# Patient Record
Sex: FEMALE | Race: WHITE | NOT HISPANIC OR LATINO | URBAN - METROPOLITAN AREA
[De-identification: names, ages, dates, MRNs, and addresses within clinical notes are randomized per-mention and may not be internally consistent; named-entity substitution may affect disease eponyms.]

---

## 2023-09-20 VITALS
RESPIRATION RATE: 16 BRPM | TEMPERATURE: 97 F | OXYGEN SATURATION: 97 % | DIASTOLIC BLOOD PRESSURE: 68 MMHG | WEIGHT: 119.71 LBS | HEART RATE: 87 BPM | SYSTOLIC BLOOD PRESSURE: 99 MMHG | HEIGHT: 66 IN

## 2023-09-20 RX ORDER — ESCITALOPRAM OXALATE 10 MG/1
2 TABLET, FILM COATED ORAL
Refills: 0 | DISCHARGE

## 2023-09-21 ENCOUNTER — INPATIENT (INPATIENT)
Facility: HOSPITAL | Age: 33
LOS: 3 days | Discharge: HOME | DRG: 138 | End: 2023-09-25
Attending: PLASTIC SURGERY | Admitting: PLASTIC SURGERY
Payer: COMMERCIAL

## 2023-09-21 DIAGNOSIS — Z98.890 OTHER SPECIFIED POSTPROCEDURAL STATES: Chronic | ICD-10-CM

## 2023-09-21 LAB
ANION GAP SERPL CALC-SCNC: 12 MMOL/L — SIGNIFICANT CHANGE UP (ref 5–17)
APTT BLD: 26.1 SEC — SIGNIFICANT CHANGE UP (ref 24.5–35.6)
BUN SERPL-MCNC: 16 MG/DL — SIGNIFICANT CHANGE UP (ref 7–23)
CALCIUM SERPL-MCNC: 8.6 MG/DL — SIGNIFICANT CHANGE UP (ref 8.4–10.5)
CHLORIDE SERPL-SCNC: 102 MMOL/L — SIGNIFICANT CHANGE UP (ref 96–108)
CO2 SERPL-SCNC: 20 MMOL/L — LOW (ref 22–31)
CREAT SERPL-MCNC: 0.73 MG/DL — SIGNIFICANT CHANGE UP (ref 0.5–1.3)
EGFR: 111 ML/MIN/1.73M2 — SIGNIFICANT CHANGE UP
GLUCOSE BLDC GLUCOMTR-MCNC: 112 MG/DL — HIGH (ref 70–99)
GLUCOSE BLDC GLUCOMTR-MCNC: 141 MG/DL — HIGH (ref 70–99)
GLUCOSE SERPL-MCNC: 124 MG/DL — HIGH (ref 70–99)
HCT VFR BLD CALC: 33.9 % — LOW (ref 34.5–45)
HGB BLD-MCNC: 12 G/DL — SIGNIFICANT CHANGE UP (ref 11.5–15.5)
INR BLD: 1.16 — SIGNIFICANT CHANGE UP (ref 0.85–1.18)
MAGNESIUM SERPL-MCNC: 1.6 MG/DL — SIGNIFICANT CHANGE UP (ref 1.6–2.6)
MCHC RBC-ENTMCNC: 31.2 PG — SIGNIFICANT CHANGE UP (ref 27–34)
MCHC RBC-ENTMCNC: 35.4 GM/DL — SIGNIFICANT CHANGE UP (ref 32–36)
MCV RBC AUTO: 88.1 FL — SIGNIFICANT CHANGE UP (ref 80–100)
NRBC # BLD: 0 /100 WBCS — SIGNIFICANT CHANGE UP (ref 0–0)
PHOSPHATE SERPL-MCNC: 1.2 MG/DL — LOW (ref 2.5–4.5)
PLATELET # BLD AUTO: 227 K/UL — SIGNIFICANT CHANGE UP (ref 150–400)
POTASSIUM SERPL-MCNC: 4.2 MMOL/L — SIGNIFICANT CHANGE UP (ref 3.5–5.3)
POTASSIUM SERPL-SCNC: 4.2 MMOL/L — SIGNIFICANT CHANGE UP (ref 3.5–5.3)
PROTHROM AB SERPL-ACNC: 13.2 SEC — HIGH (ref 9.5–13)
RBC # BLD: 3.85 M/UL — SIGNIFICANT CHANGE UP (ref 3.8–5.2)
RBC # FLD: 12.9 % — SIGNIFICANT CHANGE UP (ref 10.3–14.5)
SODIUM SERPL-SCNC: 134 MMOL/L — LOW (ref 135–145)
WBC # BLD: 9.45 K/UL — SIGNIFICANT CHANGE UP (ref 3.8–10.5)
WBC # FLD AUTO: 9.45 K/UL — SIGNIFICANT CHANGE UP (ref 3.8–10.5)

## 2023-09-21 PROCEDURE — 99221 1ST HOSP IP/OBS SF/LOW 40: CPT | Mod: GC

## 2023-09-21 PROCEDURE — 88307 TISSUE EXAM BY PATHOLOGIST: CPT | Mod: 26

## 2023-09-21 RX ORDER — SODIUM CHLORIDE 9 MG/ML
1000 INJECTION, SOLUTION INTRAVENOUS
Refills: 0 | Status: DISCONTINUED | OUTPATIENT
Start: 2023-09-21 | End: 2023-09-22

## 2023-09-21 RX ORDER — HYDROMORPHONE HYDROCHLORIDE 2 MG/ML
0.2 INJECTION INTRAMUSCULAR; INTRAVENOUS; SUBCUTANEOUS EVERY 4 HOURS
Refills: 0 | Status: DISCONTINUED | OUTPATIENT
Start: 2023-09-21 | End: 2023-09-25

## 2023-09-21 RX ORDER — SODIUM CHLORIDE 9 MG/ML
1000 INJECTION, SOLUTION INTRAVENOUS
Refills: 0 | Status: DISCONTINUED | OUTPATIENT
Start: 2023-09-21 | End: 2023-09-25

## 2023-09-21 RX ORDER — GLUCAGON INJECTION, SOLUTION 0.5 MG/.1ML
1 INJECTION, SOLUTION SUBCUTANEOUS ONCE
Refills: 0 | Status: DISCONTINUED | OUTPATIENT
Start: 2023-09-21 | End: 2023-09-25

## 2023-09-21 RX ORDER — DEXTROSE 50 % IN WATER 50 %
25 SYRINGE (ML) INTRAVENOUS ONCE
Refills: 0 | Status: DISCONTINUED | OUTPATIENT
Start: 2023-09-21 | End: 2023-09-25

## 2023-09-21 RX ORDER — DEXAMETHASONE 0.5 MG/5ML
10 ELIXIR ORAL EVERY 6 HOURS
Refills: 0 | Status: DISCONTINUED | OUTPATIENT
Start: 2023-09-21 | End: 2023-09-24

## 2023-09-21 RX ORDER — SPIRONOLACTONE 25 MG/1
1 TABLET, FILM COATED ORAL
Refills: 0 | DISCHARGE

## 2023-09-21 RX ORDER — EMTRICITABINE AND TENOFOVIR DISOPROXIL FUMARATE 200; 300 MG/1; MG/1
1 TABLET, FILM COATED ORAL
Refills: 0 | DISCHARGE

## 2023-09-21 RX ORDER — INFLUENZA VIRUS VACCINE 15; 15; 15; 15 UG/.5ML; UG/.5ML; UG/.5ML; UG/.5ML
0.5 SUSPENSION INTRAMUSCULAR ONCE
Refills: 0 | Status: DISCONTINUED | OUTPATIENT
Start: 2023-09-21 | End: 2023-09-22

## 2023-09-21 RX ORDER — MAGNESIUM SULFATE 500 MG/ML
2 VIAL (ML) INJECTION ONCE
Refills: 0 | Status: DISCONTINUED | OUTPATIENT
Start: 2023-09-21 | End: 2023-09-21

## 2023-09-21 RX ORDER — NORETHINDRONE 0.35 MG/1
1 TABLET ORAL
Refills: 0 | DISCHARGE

## 2023-09-21 RX ORDER — DEXTROSE 50 % IN WATER 50 %
15 SYRINGE (ML) INTRAVENOUS ONCE
Refills: 0 | Status: DISCONTINUED | OUTPATIENT
Start: 2023-09-21 | End: 2023-09-25

## 2023-09-21 RX ORDER — MAGNESIUM SULFATE 500 MG/ML
2 VIAL (ML) INJECTION ONCE
Refills: 0 | Status: COMPLETED | OUTPATIENT
Start: 2023-09-21 | End: 2023-09-21

## 2023-09-21 RX ORDER — DEXTROSE 50 % IN WATER 50 %
12.5 SYRINGE (ML) INTRAVENOUS ONCE
Refills: 0 | Status: DISCONTINUED | OUTPATIENT
Start: 2023-09-21 | End: 2023-09-25

## 2023-09-21 RX ORDER — DEXAMETHASONE 0.5 MG/5ML
10 ELIXIR ORAL EVERY 6 HOURS
Refills: 0 | Status: DISCONTINUED | OUTPATIENT
Start: 2023-09-21 | End: 2023-09-21

## 2023-09-21 RX ORDER — ACETAMINOPHEN 500 MG
1000 TABLET ORAL ONCE
Refills: 0 | Status: COMPLETED | OUTPATIENT
Start: 2023-09-21 | End: 2023-09-21

## 2023-09-21 RX ORDER — CHLORHEXIDINE GLUCONATE 213 G/1000ML
15 SOLUTION TOPICAL
Refills: 0 | Status: DISCONTINUED | OUTPATIENT
Start: 2023-09-21 | End: 2023-09-24

## 2023-09-21 RX ORDER — HYDROMORPHONE HYDROCHLORIDE 2 MG/ML
0.5 INJECTION INTRAMUSCULAR; INTRAVENOUS; SUBCUTANEOUS EVERY 4 HOURS
Refills: 0 | Status: DISCONTINUED | OUTPATIENT
Start: 2023-09-21 | End: 2023-09-25

## 2023-09-21 RX ORDER — ESCITALOPRAM OXALATE 10 MG/1
1 TABLET, FILM COATED ORAL
Refills: 0 | DISCHARGE

## 2023-09-21 RX ORDER — INSULIN LISPRO 100/ML
VIAL (ML) SUBCUTANEOUS EVERY 6 HOURS
Refills: 0 | Status: DISCONTINUED | OUTPATIENT
Start: 2023-09-21 | End: 2023-09-25

## 2023-09-21 RX ORDER — LIDOCAINE 4 G/100G
1 CREAM TOPICAL ONCE
Refills: 0 | Status: DISCONTINUED | OUTPATIENT
Start: 2023-09-21 | End: 2023-09-25

## 2023-09-21 RX ORDER — CHLORHEXIDINE GLUCONATE 213 G/1000ML
1 SOLUTION TOPICAL
Refills: 0 | Status: DISCONTINUED | OUTPATIENT
Start: 2023-09-21 | End: 2023-09-22

## 2023-09-21 RX ADMIN — HYDROMORPHONE HYDROCHLORIDE 0.5 MILLIGRAM(S): 2 INJECTION INTRAMUSCULAR; INTRAVENOUS; SUBCUTANEOUS at 22:47

## 2023-09-21 RX ADMIN — HYDROMORPHONE HYDROCHLORIDE 0.2 MILLIGRAM(S): 2 INJECTION INTRAMUSCULAR; INTRAVENOUS; SUBCUTANEOUS at 14:23

## 2023-09-21 RX ADMIN — SODIUM CHLORIDE 75 MILLILITER(S): 9 INJECTION, SOLUTION INTRAVENOUS at 18:53

## 2023-09-21 RX ADMIN — SODIUM CHLORIDE 75 MILLILITER(S): 9 INJECTION, SOLUTION INTRAVENOUS at 11:48

## 2023-09-21 RX ADMIN — Medication 100 MILLIGRAM(S): at 23:18

## 2023-09-21 RX ADMIN — HYDROMORPHONE HYDROCHLORIDE 0.5 MILLIGRAM(S): 2 INJECTION INTRAMUSCULAR; INTRAVENOUS; SUBCUTANEOUS at 12:03

## 2023-09-21 RX ADMIN — Medication 100 MILLIGRAM(S): at 15:35

## 2023-09-21 RX ADMIN — HYDROMORPHONE HYDROCHLORIDE 0.2 MILLIGRAM(S): 2 INJECTION INTRAMUSCULAR; INTRAVENOUS; SUBCUTANEOUS at 14:48

## 2023-09-21 RX ADMIN — HYDROMORPHONE HYDROCHLORIDE 0.5 MILLIGRAM(S): 2 INJECTION INTRAMUSCULAR; INTRAVENOUS; SUBCUTANEOUS at 23:02

## 2023-09-21 RX ADMIN — HYDROMORPHONE HYDROCHLORIDE 0.5 MILLIGRAM(S): 2 INJECTION INTRAMUSCULAR; INTRAVENOUS; SUBCUTANEOUS at 19:08

## 2023-09-21 RX ADMIN — Medication 1000 MILLIGRAM(S): at 22:47

## 2023-09-21 RX ADMIN — Medication 85 MILLIMOLE(S): at 15:35

## 2023-09-21 RX ADMIN — Medication 25 GRAM(S): at 13:27

## 2023-09-21 RX ADMIN — HYDROMORPHONE HYDROCHLORIDE 0.5 MILLIGRAM(S): 2 INJECTION INTRAMUSCULAR; INTRAVENOUS; SUBCUTANEOUS at 18:44

## 2023-09-21 RX ADMIN — HYDROMORPHONE HYDROCHLORIDE 0.2 MILLIGRAM(S): 2 INJECTION INTRAMUSCULAR; INTRAVENOUS; SUBCUTANEOUS at 21:13

## 2023-09-21 RX ADMIN — Medication 400 MILLIGRAM(S): at 22:32

## 2023-09-21 RX ADMIN — HYDROMORPHONE HYDROCHLORIDE 0.2 MILLIGRAM(S): 2 INJECTION INTRAMUSCULAR; INTRAVENOUS; SUBCUTANEOUS at 21:28

## 2023-09-21 RX ADMIN — Medication 102 MILLIGRAM(S): at 15:34

## 2023-09-21 RX ADMIN — HYDROMORPHONE HYDROCHLORIDE 0.5 MILLIGRAM(S): 2 INJECTION INTRAMUSCULAR; INTRAVENOUS; SUBCUTANEOUS at 11:48

## 2023-09-21 RX ADMIN — CHLORHEXIDINE GLUCONATE 15 MILLILITER(S): 213 SOLUTION TOPICAL at 18:44

## 2023-09-21 RX ADMIN — Medication 102 MILLIGRAM(S): at 21:13

## 2023-09-21 NOTE — PRE-ANESTHESIA EVALUATION ADULT - NSANTHOSAYNRD_GEN_A_CORE
No. ROSE MARIE screening performed.  STOP BANG Legend: 0-2 = LOW Risk; 3-4 = INTERMEDIATE Risk; 5-8 = HIGH Risk

## 2023-09-21 NOTE — H&P ADULT - NSHPPHYSICALEXAM_GEN_ALL_CORE
PHYSICAL EXAM:  GENERAL: NAD, well-groomed, well-developed  HEAD:  Atraumatic, Normocephalic  HEART: Regular rate and rhythm; No murmurs, rubs, or gallops  RESPIRATORY: CTA B/L, No W/R/R  ABDOMEN: Soft, Nontender, Nondistended  SKIN: warm, dry, normal color, no rash or abnormal lesions

## 2023-09-21 NOTE — H&P ADULT - NSHPLABSRESULTS_GEN_ALL_CORE
Complete Blood Count STAT (09.21.23 @ 11:26)    Nucleated RBC: 0 /100 WBCs    WBC Count: 9.45 K/uL    RBC Count: 3.85 M/uL    Hemoglobin: 12.0 g/dL    Hematocrit: 33.9 %    Mean Cell Volume: 88.1 fl    Mean Cell Hemoglobin: 31.2 pg    Mean Cell Hemoglobin Conc: 35.4 gm/dL    Red Cell Distrib Width: 12.9 %    Platelet Count - Automated: 227 K/uL

## 2023-09-21 NOTE — H&P ADULT - ASSESSMENT
34 YO F hx of depression anxiety, presenting for tongue reduction.    - plan for OR for tongue reduction on 9/21  - pain control  - plan for ICU admission post operatively for airway monitoring    Plastic Surgery

## 2023-09-21 NOTE — CONSULT NOTE ADULT - ATTENDING COMMENTS
Macroplatelet disorder s/p glossectomy  physical as above  continue humidified oxygen  pain control with dilaudid  Clindamycin

## 2023-09-21 NOTE — H&P ADULT - NSICDXPASTMEDICALHX_GEN_ALL_CORE_FT
PAST MEDICAL HISTORY:  Anxiety     H/O hypoglycemia     History of chronic fatigue syndrome     History of surgery     History of temporomandibular joint disorder     PCOS (polycystic ovarian syndrome)

## 2023-09-21 NOTE — CONSULT NOTE ADULT - SUBJECTIVE AND OBJECTIVE BOX
33yF PMHx depression, anxiety, macroplatelets (bleeds easily?), who presents w/ enlarged tongue, now s/p tongue reduction (U-shaped glossectomy, closed primarily) (9/21). Admitted to SICU postoperatively for hemodynamic and airway monitoring.    Allergies    adhesives (Rash)  fluconazole (Unknown)  sulfa drugs (Unknown)  Gluten (Other)        ICU Vital Signs Last 24 Hrs  T(F): 97.2 (09-20-23 @ 15:44), Max: 97.2 (09-20-23 @ 15:44)  HR: 87 (09-21-23 @ 07:30) (87 - 87)  BP: 99/68 (09-21-23 @ 07:30) (99/68 - 99/68)  BP(mean): --  ABP: --  RR: 16 (09-21-23 @ 07:30) (16 - 16)  SpO2: 97% (09-21-23 @ 07:30) (97% - 97%)    PHYSICAL EXAM:   Neurological: AAOx3, CNII-XII intact,  strength 5/5 b/l  ENT: mucus membrane moist, incisions on tongue clean, dry and intact, minimal swelling noted  Cardiovascular: RRR  Respiratory: CTA, on face tent, no increased respiratory effort  Gastrointestinal: soft, NT, ND  Extremities: warm, no dependent edema  Vascular: no cyanosis/erythema  Skin: no rashes  MSK: no joint swelling.     CAPILLARY BLOOD GLUCOSE      POCT Blood Glucose.: 112 mg/dL (21 Sep 2023 11:00)         33yF PMHx depression, anxiety, macroplatelets (bleeds easily?), who presents w/ enlarged tongue, now s/p tongue reduction (U-shaped glossectomy, closed primarily) (9/21). Admitted to SICU postoperatively for hemodynamic and airway monitoring.    Meds:  4.5mg Naltrexone  0.35mg Norethindrone  Truvada  5mg Lexapro    Allergies    adhesives (Rash)  fluconazole (Unknown)  sulfa drugs (Unknown)  Gluten (Other)        ICU Vital Signs Last 24 Hrs  T(F): 97.2 (09-20-23 @ 15:44), Max: 97.2 (09-20-23 @ 15:44)  HR: 87 (09-21-23 @ 07:30) (87 - 87)  BP: 99/68 (09-21-23 @ 07:30) (99/68 - 99/68)  BP(mean): --  ABP: --  RR: 16 (09-21-23 @ 07:30) (16 - 16)  SpO2: 97% (09-21-23 @ 07:30) (97% - 97%)    PHYSICAL EXAM:   Neurological: AAOx3, CNII-XII intact,  strength 5/5 b/l  ENT: mucus membrane moist, incisions on tongue intact, some mild oozing present, no active bleeding present  Cardiovascular: RRR  Respiratory: CTA, on face tent, no increased respiratory effort  Gastrointestinal: soft, NT, ND  Extremities: warm, no dependent edema  Vascular: no cyanosis/erythema  Skin: no rashes  MSK: no joint swelling.     CAPILLARY BLOOD GLUCOSE      POCT Blood Glucose.: 112 mg/dL (21 Sep 2023 11:00)

## 2023-09-21 NOTE — CONSULT NOTE ADULT - ASSESSMENT
33yF PMHx depression, anxiety, macroplatelets (bleeds easily?), who presents w/ enlarged tongue, now s/p tongue reduction (U-shaped glossectomy, closed primarily) (9/21). Admitted to SICU postoperatively for hemodynamic and airway monitoring.     NEURO: Aox3. Anxiety, depression on ____Pain control with dilaudid prn.   HEENT: S/p partial glossectomy. Decadron 10 q6. Peridex 15mg  CV: HD stable MAPs >65.   PULM: Breathing on RA.   GI/FEN: NPO except meds/IVF  : Primafit TOV (P)   ENDO: mISS  ID: Clindamycin 900mg qd x5 days  PPX: SCDs, hold SQH  LINES: PIVs  DRAINS: None  DISPO: SICU for 24 hrs 33yF PMHx depression, anxiety, macroplatelets (bleeds easily?), who presents w/ enlarged tongue, now s/p tongue reduction (U-shaped glossectomy, closed primarily) (9/21). Admitted to SICU postoperatively for hemodynamic and airway monitoring.     NEURO: Aox3. Anxiety, depression on lexapro 5mg, naltrexone 4.5mg. Pain control with dilaudid prn.   HEENT: S/p partial glossectomy. Decadron 10 q6. Peridex 15mg. Pt can suction top of tongue, avoiding incisions.  CV: HD stable MAPs >65.   PULM: Breathing on RA.   GI/FEN: NPO except meds/IVF  : Primafit TOV (P)   ENDO: mISS  ID: Clindamycin 900mg qd x5 days. Pt on Truvada at home.  PPX: SCDs, hold SQH  LINES: PIVs  DRAINS: None  DISPO: SICU for 24 hrs

## 2023-09-21 NOTE — H&P ADULT - NSICDXPASTSURGICALHX_GEN_ALL_CORE_FT
PAST SURGICAL HISTORY:  H/O rhinoplasty     History of nasal septoplasty     History of surgery turbenectomy    History of surgery labialplasty    History of surgery vestebulectomy

## 2023-09-21 NOTE — BRIEF OPERATIVE NOTE - OPERATION/FINDINGS
Pt w/ macroglossia.  Partial glossectomy by excision of U shaped wedge of lateral and anterior aspects of tongue.  Closed with 2-0, 3-0 vicryls.

## 2023-09-22 LAB
A1C WITH ESTIMATED AVERAGE GLUCOSE RESULT: 5.4 % — SIGNIFICANT CHANGE UP (ref 4–5.6)
ANION GAP SERPL CALC-SCNC: 10 MMOL/L — SIGNIFICANT CHANGE UP (ref 5–17)
BUN SERPL-MCNC: 11 MG/DL — SIGNIFICANT CHANGE UP (ref 7–23)
CALCIUM SERPL-MCNC: 9.1 MG/DL — SIGNIFICANT CHANGE UP (ref 8.4–10.5)
CHLORIDE SERPL-SCNC: 102 MMOL/L — SIGNIFICANT CHANGE UP (ref 96–108)
CO2 SERPL-SCNC: 23 MMOL/L — SIGNIFICANT CHANGE UP (ref 22–31)
CREAT SERPL-MCNC: 0.6 MG/DL — SIGNIFICANT CHANGE UP (ref 0.5–1.3)
EGFR: 121 ML/MIN/1.73M2 — SIGNIFICANT CHANGE UP
ESTIMATED AVERAGE GLUCOSE: 108 MG/DL — SIGNIFICANT CHANGE UP (ref 68–114)
GLUCOSE BLDC GLUCOMTR-MCNC: 110 MG/DL — HIGH (ref 70–99)
GLUCOSE BLDC GLUCOMTR-MCNC: 123 MG/DL — HIGH (ref 70–99)
GLUCOSE BLDC GLUCOMTR-MCNC: 131 MG/DL — HIGH (ref 70–99)
GLUCOSE BLDC GLUCOMTR-MCNC: 139 MG/DL — HIGH (ref 70–99)
GLUCOSE BLDC GLUCOMTR-MCNC: 144 MG/DL — HIGH (ref 70–99)
GLUCOSE SERPL-MCNC: 146 MG/DL — HIGH (ref 70–99)
HCT VFR BLD CALC: 37.3 % — SIGNIFICANT CHANGE UP (ref 34.5–45)
HGB BLD-MCNC: 12.5 G/DL — SIGNIFICANT CHANGE UP (ref 11.5–15.5)
MAGNESIUM SERPL-MCNC: 2.1 MG/DL — SIGNIFICANT CHANGE UP (ref 1.6–2.6)
MCHC RBC-ENTMCNC: 30.7 PG — SIGNIFICANT CHANGE UP (ref 27–34)
MCHC RBC-ENTMCNC: 33.5 GM/DL — SIGNIFICANT CHANGE UP (ref 32–36)
MCV RBC AUTO: 91.6 FL — SIGNIFICANT CHANGE UP (ref 80–100)
NRBC # BLD: 0 /100 WBCS — SIGNIFICANT CHANGE UP (ref 0–0)
PHOSPHATE SERPL-MCNC: 3.7 MG/DL — SIGNIFICANT CHANGE UP (ref 2.5–4.5)
PLATELET # BLD AUTO: 223 K/UL — SIGNIFICANT CHANGE UP (ref 150–400)
POTASSIUM SERPL-MCNC: 4.6 MMOL/L — SIGNIFICANT CHANGE UP (ref 3.5–5.3)
POTASSIUM SERPL-SCNC: 4.6 MMOL/L — SIGNIFICANT CHANGE UP (ref 3.5–5.3)
RBC # BLD: 4.07 M/UL — SIGNIFICANT CHANGE UP (ref 3.8–5.2)
RBC # FLD: 13.2 % — SIGNIFICANT CHANGE UP (ref 10.3–14.5)
SODIUM SERPL-SCNC: 135 MMOL/L — SIGNIFICANT CHANGE UP (ref 135–145)
WBC # BLD: 11.97 K/UL — HIGH (ref 3.8–10.5)
WBC # FLD AUTO: 11.97 K/UL — HIGH (ref 3.8–10.5)

## 2023-09-22 PROCEDURE — 99231 SBSQ HOSP IP/OBS SF/LOW 25: CPT | Mod: GC

## 2023-09-22 RX ORDER — ACETAMINOPHEN 500 MG
1000 TABLET ORAL ONCE
Refills: 0 | Status: COMPLETED | OUTPATIENT
Start: 2023-09-22 | End: 2023-09-22

## 2023-09-22 RX ORDER — ACETAMINOPHEN 500 MG
650 TABLET ORAL EVERY 4 HOURS
Refills: 0 | Status: DISCONTINUED | OUTPATIENT
Start: 2023-09-22 | End: 2023-09-22

## 2023-09-22 RX ORDER — SODIUM CHLORIDE 9 MG/ML
1000 INJECTION, SOLUTION INTRAVENOUS
Refills: 0 | Status: DISCONTINUED | OUTPATIENT
Start: 2023-09-22 | End: 2023-09-25

## 2023-09-22 RX ADMIN — Medication 102 MILLIGRAM(S): at 03:47

## 2023-09-22 RX ADMIN — Medication 100 MILLIGRAM(S): at 23:04

## 2023-09-22 RX ADMIN — HYDROMORPHONE HYDROCHLORIDE 0.5 MILLIGRAM(S): 2 INJECTION INTRAMUSCULAR; INTRAVENOUS; SUBCUTANEOUS at 15:33

## 2023-09-22 RX ADMIN — CHLORHEXIDINE GLUCONATE 15 MILLILITER(S): 213 SOLUTION TOPICAL at 17:33

## 2023-09-22 RX ADMIN — Medication 400 MILLIGRAM(S): at 21:58

## 2023-09-22 RX ADMIN — Medication 100 MILLIGRAM(S): at 07:01

## 2023-09-22 RX ADMIN — HYDROMORPHONE HYDROCHLORIDE 0.5 MILLIGRAM(S): 2 INJECTION INTRAMUSCULAR; INTRAVENOUS; SUBCUTANEOUS at 09:04

## 2023-09-22 RX ADMIN — CHLORHEXIDINE GLUCONATE 1 APPLICATION(S): 213 SOLUTION TOPICAL at 06:28

## 2023-09-22 RX ADMIN — Medication 102 MILLIGRAM(S): at 15:07

## 2023-09-22 RX ADMIN — Medication 102 MILLIGRAM(S): at 09:04

## 2023-09-22 RX ADMIN — HYDROMORPHONE HYDROCHLORIDE 0.5 MILLIGRAM(S): 2 INJECTION INTRAMUSCULAR; INTRAVENOUS; SUBCUTANEOUS at 19:45

## 2023-09-22 RX ADMIN — Medication 1000 MILLIGRAM(S): at 22:10

## 2023-09-22 RX ADMIN — HYDROMORPHONE HYDROCHLORIDE 0.5 MILLIGRAM(S): 2 INJECTION INTRAMUSCULAR; INTRAVENOUS; SUBCUTANEOUS at 19:27

## 2023-09-22 RX ADMIN — HYDROMORPHONE HYDROCHLORIDE 0.5 MILLIGRAM(S): 2 INJECTION INTRAMUSCULAR; INTRAVENOUS; SUBCUTANEOUS at 00:20

## 2023-09-22 RX ADMIN — Medication 400 MILLIGRAM(S): at 11:29

## 2023-09-22 RX ADMIN — Medication 1000 MILLIGRAM(S): at 12:59

## 2023-09-22 RX ADMIN — SODIUM CHLORIDE 75 MILLILITER(S): 9 INJECTION, SOLUTION INTRAVENOUS at 09:53

## 2023-09-22 RX ADMIN — HYDROMORPHONE HYDROCHLORIDE 0.5 MILLIGRAM(S): 2 INJECTION INTRAMUSCULAR; INTRAVENOUS; SUBCUTANEOUS at 09:39

## 2023-09-22 RX ADMIN — Medication 1000 MILLIGRAM(S): at 05:30

## 2023-09-22 RX ADMIN — CHLORHEXIDINE GLUCONATE 15 MILLILITER(S): 213 SOLUTION TOPICAL at 06:20

## 2023-09-22 RX ADMIN — HYDROMORPHONE HYDROCHLORIDE 0.5 MILLIGRAM(S): 2 INJECTION INTRAMUSCULAR; INTRAVENOUS; SUBCUTANEOUS at 03:45

## 2023-09-22 RX ADMIN — HYDROMORPHONE HYDROCHLORIDE 0.5 MILLIGRAM(S): 2 INJECTION INTRAMUSCULAR; INTRAVENOUS; SUBCUTANEOUS at 04:00

## 2023-09-22 RX ADMIN — SODIUM CHLORIDE 100 MILLILITER(S): 9 INJECTION, SOLUTION INTRAVENOUS at 17:54

## 2023-09-22 RX ADMIN — Medication 102 MILLIGRAM(S): at 21:57

## 2023-09-22 RX ADMIN — Medication 100 MILLIGRAM(S): at 15:07

## 2023-09-22 RX ADMIN — HYDROMORPHONE HYDROCHLORIDE 0.5 MILLIGRAM(S): 2 INJECTION INTRAMUSCULAR; INTRAVENOUS; SUBCUTANEOUS at 15:53

## 2023-09-22 RX ADMIN — Medication 400 MILLIGRAM(S): at 05:15

## 2023-09-22 NOTE — SWALLOW BEDSIDE ASSESSMENT ADULT - SLP PERTINENT HISTORY OF CURRENT PROBLEM
PMHx depression, anxiety, macroplatelet disorder (bleeds easily?), who presented on 9/21/23 with enlarged tongue, now s/p tongue reduction (U-shaped glossectomy, closed primarily) on 9/21/23. Pt admitted to SICU post-operatively for hemodynamic and airway monitoring, now in step down.

## 2023-09-22 NOTE — SWALLOW BEDSIDE ASSESSMENT ADULT - ADDITIONAL RECOMMENDATIONS
LT: Patient will safely tolerate the least restrictive diet without overt s/s of penetration/aspiration or changes to pulmonary status.   ST: Patient will safely tolerate full liquids without overt s/s of penetration/aspiration or changes to pulmonary status.

## 2023-09-22 NOTE — PROGRESS NOTE ADULT - SUBJECTIVE AND OBJECTIVE BOX
Doing well. In good spirits  Wasn't able to drink water  Afebrile, VSS  No active bleeding  Intra-oral - Tongue with significant edema.   Descent oral hygiene    P/ Advised that she hsould proceed very slowly with diet  Also, reinforced that semi-solid is easier to swallow than water with so much edema and relative immobility of the tongue     - she understands  Cont IV hydration  Ambulate

## 2023-09-22 NOTE — SWALLOW BEDSIDE ASSESSMENT ADULT - PHARYNGEAL PHASE
Seemingly timely swallow, hyolaryngeal movement, with no clinical indicators of penetration/aspiration and pharyngeal inefficiency.

## 2023-09-22 NOTE — PROGRESS NOTE ADULT - ASSESSMENT
33yF PMHx depression, anxiety, macroplatelet disorder (bleeds easily?), who presents w/ enlarged tongue, now s/p tongue reduction (U-shaped glossectomy, closed primarily) (9/21). Admitted to SICU postoperatively for hemodynamic and airway monitoring.     NEURO: Aox3. Anxiety, depression on Lexapro 5mg, naltrexone 5mg. Pain control with dilaudid prn.   HEENT: S/p partial glossectomy. Decadron 10 q6. Peridex 15mg. Pt can suction on top of tongue, avoiding sutures.  CV: HD stable MAPs >65.   PULM: Breathing on RA.   GI/FEN: NPO except meds/IVF  : Voids, primafit  ENDO: mISS  ID: Clindamycin 900mg qd x5 days. On Truvada at home?  PPX: SCDs, hold SQH  LINES: PIVs  DRAINS: None  DISPO: SDU today 33yF PMHx depression, anxiety, macroplatelet disorder (bleeds easily?), who presents w/ enlarged tongue, now s/p tongue reduction (U-shaped glossectomy, closed primarily) (9/21). Admitted to SICU postoperatively for hemodynamic and airway monitoring.     NEURO: Aox3. Anxiety, depression on Lexapro 5mg, naltrexone 5mg. Pain control with tylenol, dilaudid prn.   HEENT: S/p partial glossectomy. Decadron 10 q6. Peridex 15mg. Pt can suction on top of tongue, avoiding sutures.  CV: HD stable MAPs >65.   PULM: Breathing on RA.   GI/FEN: NPO except meds/IVF. Speech and Swallow eval today.  : Voids, primafit  ENDO: mISS  ID: Clindamycin 900mg qd x5 days. On Truvada at home?  PPX: SCDs, hold SQH  LINES: PIVs  DRAINS: None  DISPO: SDU today

## 2023-09-22 NOTE — PROGRESS NOTE ADULT - ATTENDING COMMENTS
S/P glossectomy  physical as above  tylenol and oxycodone with breakthrough dilaudid  swallowing evaluation  continue clindamycin per surgery

## 2023-09-22 NOTE — SWALLOW BEDSIDE ASSESSMENT ADULT - ASR SWALLOW LINGUAL MOBILITY
Limited mobility of tongue at this time/impaired protrusion/impaired anterior elevation/impaired left lateral movement/impaired right lateral movement

## 2023-09-22 NOTE — PROGRESS NOTE ADULT - SUBJECTIVE AND OBJECTIVE BOX
S: Pt seen and evaluated on am rounds. Pt doing well with no acute events overnight.     O: ICU Vital Signs Last 24 Hrs  T(F): 98.1 (09-22-23 @ 05:11), Max: 98.6 (09-21-23 @ 10:30)  HR: 89 (09-22-23 @ 07:05) (58 - 101)  BP: 112/81 (09-22-23 @ 07:05) (99/65 - 135/89)  BP(mean): 92 (09-22-23 @ 07:05) (78 - 107)  ABP: --  RR: 18 (09-22-23 @ 07:05) (11 - 22)  SpO2: 98% (09-22-23 @ 07:05) (95% - 100%)    PHYSICAL EXAM:   Neurological: AAOx3, CNII-XII intact,  strength 5/5 b/l  ENT: mucus membrane moist  Cardiovascular: RRR  Respiratory: CTA  Gastrointestinal: soft, NT, ND, BS+  Extremities: warm, no dependent edema  Vascular: no cyanosis/erythema  Skin: no rashes  MSK: no joint swelling.     LABS:    09-22    135  |  102  |  11  ----------------------------<  146<H>  4.6   |  23  |  0.60    Ca    9.1      22 Sep 2023 05:28  Phos  3.7     09-22  Mg     2.1     09-22                          12.5   11.97 )-----------( 223      ( 22 Sep 2023 05:28 )             37.3   PT/INR - ( 21 Sep 2023 11:26 )   PT: 13.2 sec;   INR: 1.16          PTT - ( 21 Sep 2023 11:26 )  PTT:26.1 sec  Urinalysis Basic - ( 22 Sep 2023 05:28 )    Color: x / Appearance: x / SG: x / pH: x  Gluc: 146 mg/dL / Ketone: x  / Bili: x / Urobili: x   Blood: x / Protein: x / Nitrite: x   Leuk Esterase: x / RBC: x / WBC x   Sq Epi: x / Non Sq Epi: x / Bacteria: x    CAPILLARY BLOOD GLUCOSE      POCT Blood Glucose.: 110 mg/dL (22 Sep 2023 06:32)  POCT Blood Glucose.: 131 mg/dL (22 Sep 2023 00:11)  POCT Blood Glucose.: 141 mg/dL (21 Sep 2023 18:49)  POCT Blood Glucose.: 112 mg/dL (21 Sep 2023 11:00)    MEDICATIONS  (STANDING):  chlorhexidine 0.12% Liquid 15 milliLiter(s) Oral Mucosa two times a day  chlorhexidine 2% Cloths 1 Application(s) Topical <User Schedule>  clindamycin IVPB 900 milliGRAM(s) IV Intermittent every 8 hours  dexAMETHasone  IVPB 10 milliGRAM(s) IV Intermittent every 6 hours  dextrose 5%. 1000 milliLiter(s) (50 mL/Hr) IV Continuous <Continuous>  dextrose 5%. 1000 milliLiter(s) (100 mL/Hr) IV Continuous <Continuous>  dextrose 50% Injectable 25 Gram(s) IV Push once  dextrose 50% Injectable 25 Gram(s) IV Push once  dextrose 50% Injectable 12.5 Gram(s) IV Push once  glucagon  Injectable 1 milliGRAM(s) IntraMuscular once  influenza   Vaccine 0.5 milliLiter(s) IntraMuscular once  insulin lispro (ADMELOG) corrective regimen sliding scale   SubCutaneous every 6 hours  lactated ringers. 1000 milliLiter(s) (75 mL/Hr) IV Continuous <Continuous>  lidocaine 5% Ointment 1 Application(s) Topical once    MEDICATIONS  (PRN):  dextrose Oral Gel 15 Gram(s) Oral once PRN Blood Glucose LESS THAN 70 milliGRAM(s)/deciliter  HYDROmorphone  Injectable 0.5 milliGRAM(s) IV Push every 4 hours PRN Severe Pain (7 - 10)  HYDROmorphone  Injectable 0.2 milliGRAM(s) IV Push every 4 hours PRN Moderate Pain (4 - 6)      Chase:	  [ ] None	[ ] Daily Chase Order Placed	   Indication:	  [ ] Strict I and O's    [ ] Obstruction     [ ] Incontinence + Stage 3 or 4 Decubitus  Central Line:  [ ] None	   [ ]  Medication / TPN Administration     [ ] No Peripheral IV        S: Pt seen and evaluated on am rounds. Pt doing well with no acute events overnight. Still some pain but no SOB    O: ICU Vital Signs Last 24 Hrs  T(F): 98.1 (09-22-23 @ 05:11), Max: 98.6 (09-21-23 @ 10:30)  HR: 89 (09-22-23 @ 07:05) (58 - 101)  BP: 112/81 (09-22-23 @ 07:05) (99/65 - 135/89)  BP(mean): 92 (09-22-23 @ 07:05) (78 - 107)  ABP: --  RR: 18 (09-22-23 @ 07:05) (11 - 22)  SpO2: 98% (09-22-23 @ 07:05) (95% - 100%)    PHYSICAL EXAM:   Neurological: AAOx3, CNII-XII intact,  strength 5/5 b/l  ENT: mucus membrane moist  Cardiovascular: RRR  Respiratory: CTA  Gastrointestinal: soft, NT, ND, BS+  Extremities: warm, no dependent edema  Vascular: no cyanosis/erythema  Skin: no rashes  MSK: no joint swelling.     LABS:    09-22    135  |  102  |  11  ----------------------------<  146<H>  4.6   |  23  |  0.60    Ca    9.1      22 Sep 2023 05:28  Phos  3.7     09-22  Mg     2.1     09-22                          12.5   11.97 )-----------( 223      ( 22 Sep 2023 05:28 )             37.3   PT/INR - ( 21 Sep 2023 11:26 )   PT: 13.2 sec;   INR: 1.16          PTT - ( 21 Sep 2023 11:26 )  PTT:26.1 sec  Urinalysis Basic - ( 22 Sep 2023 05:28 )    Color: x / Appearance: x / SG: x / pH: x  Gluc: 146 mg/dL / Ketone: x  / Bili: x / Urobili: x   Blood: x / Protein: x / Nitrite: x   Leuk Esterase: x / RBC: x / WBC x   Sq Epi: x / Non Sq Epi: x / Bacteria: x    CAPILLARY BLOOD GLUCOSE      POCT Blood Glucose.: 110 mg/dL (22 Sep 2023 06:32)  POCT Blood Glucose.: 131 mg/dL (22 Sep 2023 00:11)  POCT Blood Glucose.: 141 mg/dL (21 Sep 2023 18:49)  POCT Blood Glucose.: 112 mg/dL (21 Sep 2023 11:00)    MEDICATIONS  (STANDING):  chlorhexidine 0.12% Liquid 15 milliLiter(s) Oral Mucosa two times a day  chlorhexidine 2% Cloths 1 Application(s) Topical <User Schedule>  clindamycin IVPB 900 milliGRAM(s) IV Intermittent every 8 hours  dexAMETHasone  IVPB 10 milliGRAM(s) IV Intermittent every 6 hours  dextrose 5%. 1000 milliLiter(s) (50 mL/Hr) IV Continuous <Continuous>  dextrose 5%. 1000 milliLiter(s) (100 mL/Hr) IV Continuous <Continuous>  dextrose 50% Injectable 25 Gram(s) IV Push once  dextrose 50% Injectable 25 Gram(s) IV Push once  dextrose 50% Injectable 12.5 Gram(s) IV Push once  glucagon  Injectable 1 milliGRAM(s) IntraMuscular once  influenza   Vaccine 0.5 milliLiter(s) IntraMuscular once  insulin lispro (ADMELOG) corrective regimen sliding scale   SubCutaneous every 6 hours  lactated ringers. 1000 milliLiter(s) (75 mL/Hr) IV Continuous <Continuous>  lidocaine 5% Ointment 1 Application(s) Topical once    MEDICATIONS  (PRN):  dextrose Oral Gel 15 Gram(s) Oral once PRN Blood Glucose LESS THAN 70 milliGRAM(s)/deciliter  HYDROmorphone  Injectable 0.5 milliGRAM(s) IV Push every 4 hours PRN Severe Pain (7 - 10)  HYDROmorphone  Injectable 0.2 milliGRAM(s) IV Push every 4 hours PRN Moderate Pain (4 - 6)      Chase:	  [ ] None	[ ] Daily Chase Order Placed	   Indication:	  [ ] Strict I and O's    [ ] Obstruction     [ ] Incontinence + Stage 3 or 4 Decubitus  Central Line:  [ ] None	   [ ]  Medication / TPN Administration     [ ] No Peripheral IV

## 2023-09-22 NOTE — PROGRESS NOTE ADULT - SUBJECTIVE AND OBJECTIVE BOX
PLASTIC SURGERY DAILY PROGRESS NOTE:     Seen and examined at bedside  Pain controlled  Has been NPO overnight     MEDICATIONS  (STANDING):  chlorhexidine 0.12% Liquid 15 milliLiter(s) Oral Mucosa two times a day  chlorhexidine 2% Cloths 1 Application(s) Topical <User Schedule>  clindamycin IVPB 900 milliGRAM(s) IV Intermittent every 8 hours  dexAMETHasone  IVPB 10 milliGRAM(s) IV Intermittent every 6 hours  dextrose 5%. 1000 milliLiter(s) (100 mL/Hr) IV Continuous <Continuous>  dextrose 5%. 1000 milliLiter(s) (50 mL/Hr) IV Continuous <Continuous>  dextrose 50% Injectable 25 Gram(s) IV Push once  dextrose 50% Injectable 25 Gram(s) IV Push once  dextrose 50% Injectable 12.5 Gram(s) IV Push once  glucagon  Injectable 1 milliGRAM(s) IntraMuscular once  influenza   Vaccine 0.5 milliLiter(s) IntraMuscular once  insulin lispro (ADMELOG) corrective regimen sliding scale   SubCutaneous every 6 hours  lactated ringers. 1000 milliLiter(s) (75 mL/Hr) IV Continuous <Continuous>  lidocaine 5% Ointment 1 Application(s) Topical once    MEDICATIONS  (PRN):  dextrose Oral Gel 15 Gram(s) Oral once PRN Blood Glucose LESS THAN 70 milliGRAM(s)/deciliter  HYDROmorphone  Injectable 0.5 milliGRAM(s) IV Push every 4 hours PRN Severe Pain (7 - 10)  HYDROmorphone  Injectable 0.2 milliGRAM(s) IV Push every 4 hours PRN Moderate Pain (4 - 6)      OBJECTIVE:    Vital Signs Last 24 Hrs  T(C): 36.7 (22 Sep 2023 05:11), Max: 37 (21 Sep 2023 10:30)  T(F): 98.1 (22 Sep 2023 05:11), Max: 98.6 (21 Sep 2023 10:30)  HR: 89 (22 Sep 2023 07:05) (58 - 101)  BP: 112/81 (22 Sep 2023 07:05) (99/65 - 135/89)  BP(mean): 92 (22 Sep 2023 07:05) (78 - 107)  RR: 18 (22 Sep 2023 07:05) (11 - 22)  SpO2: 98% (22 Sep 2023 07:05) (95% - 100%)    Parameters below as of 22 Sep 2023 07:05  Patient On (Oxygen Delivery Method): face tent    O2 Concentration (%): 35    I&O's Detail    21 Sep 2023 07:01  -  22 Sep 2023 07:00  --------------------------------------------------------  IN:    IV PiggyBack: 50 mL    IV PiggyBack: 501 mL    IV PiggyBack: 150 mL    IV PiggyBack: 200 mL    IV PiggyBack: 150 mL    Lactated Ringers: 1500 mL  Total IN: 2551 mL    OUT:    Voided (mL): 2150 mL  Total OUT: 2150 mL    Total NET: 401 mL      22 Sep 2023 07:01  -  22 Sep 2023 07:33  --------------------------------------------------------  IN:    Lactated Ringers: 75 mL  Total IN: 75 mL    OUT:  Total OUT: 0 mL    Total NET: 75 mL          Daily     Daily     LABS:                        12.5   11.97 )-----------( 223      ( 22 Sep 2023 05:28 )             37.3     09-22    135  |  102  |  11  ----------------------------<  146<H>  4.6   |  23  |  0.60    Ca    9.1      22 Sep 2023 05:28  Phos  3.7     09-22  Mg     2.1     09-22      PT/INR - ( 21 Sep 2023 11:26 )   PT: 13.2 sec;   INR: 1.16          PTT - ( 21 Sep 2023 11:26 )  PTT:26.1 sec  Urinalysis Basic - ( 22 Sep 2023 05:28 )    Color: x / Appearance: x / SG: x / pH: x  Gluc: 146 mg/dL / Ketone: x  / Bili: x / Urobili: x   Blood: x / Protein: x / Nitrite: x   Leuk Esterase: x / RBC: x / WBC x   Sq Epi: x / Non Sq Epi: x / Bacteria: x      PHYSICAL EXAM:  General: AAOx3, NAD, lying comfortably in bed  Respiratory: nonlabored breathing  Slight Swelling orally but able to move tongue  Abdomen: non-distended, soft, non-tender  Extremities: no edema    ASSESSMENT AND PLAN:   32 YO F s/p tongue reduction on 9/21    - pain control  - ok for bedside swallow today and trial of PO if passes (CLD)  - OOB/ambulation  - care per ICU  - airway monitoring    PRS

## 2023-09-22 NOTE — SWALLOW BEDSIDE ASSESSMENT ADULT - SWALLOW EVAL: DIAGNOSIS
Limited PO accepted due to severe oral pain. Impaired oral phase characterized by reduced bolus cohesion and clearance. No paris clinical indicators of penetration/aspiration noted. Anticipate improvement in oral intake with improved pain management. Presentation c/w partial glossectomy (U-shaped, closed primarily). This service to follow up as needed.

## 2023-09-22 NOTE — SWALLOW BEDSIDE ASSESSMENT ADULT - ORAL PHASE
Prolonged bolus manipulation and reduced bolus cohesion. Minimal oral residual expectorated due to pain.

## 2023-09-22 NOTE — SWALLOW BEDSIDE ASSESSMENT ADULT - SLP GENERAL OBSERVATIONS
Pt was seen fully awake and alert, HOB fully elevated, on room air. Pt followed simple directives and communicated wants/needs. Pt reported severe oral pain at a level 8 (1-10; 10 being the most painful).

## 2023-09-23 LAB
GLUCOSE BLDC GLUCOMTR-MCNC: 143 MG/DL — HIGH (ref 70–99)
GLUCOSE BLDC GLUCOMTR-MCNC: 154 MG/DL — HIGH (ref 70–99)
GLUCOSE BLDC GLUCOMTR-MCNC: 158 MG/DL — HIGH (ref 70–99)
GLUCOSE BLDC GLUCOMTR-MCNC: 174 MG/DL — HIGH (ref 70–99)

## 2023-09-23 RX ORDER — ACETAMINOPHEN 500 MG
650 TABLET ORAL EVERY 6 HOURS
Refills: 0 | Status: DISCONTINUED | OUTPATIENT
Start: 2023-09-23 | End: 2023-09-25

## 2023-09-23 RX ORDER — ACETAMINOPHEN 500 MG
1000 TABLET ORAL ONCE
Refills: 0 | Status: COMPLETED | OUTPATIENT
Start: 2023-09-23 | End: 2023-09-23

## 2023-09-23 RX ADMIN — Medication 2: at 12:02

## 2023-09-23 RX ADMIN — SODIUM CHLORIDE 100 MILLILITER(S): 9 INJECTION, SOLUTION INTRAVENOUS at 03:29

## 2023-09-23 RX ADMIN — Medication 102 MILLIGRAM(S): at 10:38

## 2023-09-23 RX ADMIN — Medication 400 MILLIGRAM(S): at 11:45

## 2023-09-23 RX ADMIN — HYDROMORPHONE HYDROCHLORIDE 0.2 MILLIGRAM(S): 2 INJECTION INTRAMUSCULAR; INTRAVENOUS; SUBCUTANEOUS at 08:32

## 2023-09-23 RX ADMIN — HYDROMORPHONE HYDROCHLORIDE 0.5 MILLIGRAM(S): 2 INJECTION INTRAMUSCULAR; INTRAVENOUS; SUBCUTANEOUS at 06:30

## 2023-09-23 RX ADMIN — Medication 100 MILLIGRAM(S): at 06:52

## 2023-09-23 RX ADMIN — Medication 102 MILLIGRAM(S): at 15:26

## 2023-09-23 RX ADMIN — Medication 100 MILLIGRAM(S): at 22:24

## 2023-09-23 RX ADMIN — Medication 1000 MILLIGRAM(S): at 12:15

## 2023-09-23 RX ADMIN — Medication 102 MILLIGRAM(S): at 22:23

## 2023-09-23 RX ADMIN — HYDROMORPHONE HYDROCHLORIDE 0.2 MILLIGRAM(S): 2 INJECTION INTRAMUSCULAR; INTRAVENOUS; SUBCUTANEOUS at 09:21

## 2023-09-23 RX ADMIN — Medication 650 MILLIGRAM(S): at 17:42

## 2023-09-23 RX ADMIN — Medication 100 MILLIGRAM(S): at 14:36

## 2023-09-23 RX ADMIN — CHLORHEXIDINE GLUCONATE 15 MILLILITER(S): 213 SOLUTION TOPICAL at 06:10

## 2023-09-23 RX ADMIN — Medication 650 MILLIGRAM(S): at 18:24

## 2023-09-23 RX ADMIN — Medication 102 MILLIGRAM(S): at 03:28

## 2023-09-23 RX ADMIN — HYDROMORPHONE HYDROCHLORIDE 0.5 MILLIGRAM(S): 2 INJECTION INTRAMUSCULAR; INTRAVENOUS; SUBCUTANEOUS at 00:08

## 2023-09-23 RX ADMIN — CHLORHEXIDINE GLUCONATE 15 MILLILITER(S): 213 SOLUTION TOPICAL at 17:42

## 2023-09-23 RX ADMIN — Medication 2: at 06:09

## 2023-09-23 RX ADMIN — HYDROMORPHONE HYDROCHLORIDE 0.5 MILLIGRAM(S): 2 INJECTION INTRAMUSCULAR; INTRAVENOUS; SUBCUTANEOUS at 06:12

## 2023-09-23 NOTE — PROGRESS NOTE ADULT - ASSESSMENT
A/P: 33yoF s/p tongue reduction. Continued difficulty with swallowing.    - IV pain control prn, will attempt to transition to PO as tolerated  - mIVF, CLD as tolerated  - activity as tolerated  - general care

## 2023-09-23 NOTE — PROGRESS NOTE ADULT - SUBJECTIVE AND OBJECTIVE BOX
S: Difficulty sleeping overnight. Pain continues to limit ability to swallow.    O:   NAD  Tongue with continued edema, incisions intact

## 2023-09-24 LAB
GLUCOSE BLDC GLUCOMTR-MCNC: 135 MG/DL — HIGH (ref 70–99)
GLUCOSE BLDC GLUCOMTR-MCNC: 139 MG/DL — HIGH (ref 70–99)
GLUCOSE BLDC GLUCOMTR-MCNC: 167 MG/DL — HIGH (ref 70–99)
GLUCOSE BLDC GLUCOMTR-MCNC: 79 MG/DL — SIGNIFICANT CHANGE UP (ref 70–99)

## 2023-09-24 RX ORDER — IBUPROFEN 200 MG
400 TABLET ORAL EVERY 6 HOURS
Refills: 0 | Status: DISCONTINUED | OUTPATIENT
Start: 2023-09-24 | End: 2023-09-25

## 2023-09-24 RX ORDER — CHLORHEXIDINE GLUCONATE 213 G/1000ML
15 SOLUTION TOPICAL
Refills: 0 | Status: DISCONTINUED | OUTPATIENT
Start: 2023-09-24 | End: 2023-09-24

## 2023-09-24 RX ORDER — CHLORHEXIDINE GLUCONATE 213 G/1000ML
15 SOLUTION TOPICAL
Refills: 0 | Status: DISCONTINUED | OUTPATIENT
Start: 2023-09-24 | End: 2023-09-25

## 2023-09-24 RX ORDER — GABAPENTIN 400 MG/1
100 CAPSULE ORAL THREE TIMES A DAY
Refills: 0 | Status: DISCONTINUED | OUTPATIENT
Start: 2023-09-24 | End: 2023-09-24

## 2023-09-24 RX ORDER — GABAPENTIN 400 MG/1
300 CAPSULE ORAL THREE TIMES A DAY
Refills: 0 | Status: DISCONTINUED | OUTPATIENT
Start: 2023-09-24 | End: 2023-09-25

## 2023-09-24 RX ADMIN — Medication 650 MILLIGRAM(S): at 00:02

## 2023-09-24 RX ADMIN — Medication 2: at 07:06

## 2023-09-24 RX ADMIN — Medication 400 MILLIGRAM(S): at 14:30

## 2023-09-24 RX ADMIN — Medication 100 MILLIGRAM(S): at 06:02

## 2023-09-24 RX ADMIN — GABAPENTIN 300 MILLIGRAM(S): 400 CAPSULE ORAL at 18:11

## 2023-09-24 RX ADMIN — Medication 650 MILLIGRAM(S): at 12:45

## 2023-09-24 RX ADMIN — Medication 650 MILLIGRAM(S): at 18:45

## 2023-09-24 RX ADMIN — SODIUM CHLORIDE 100 MILLILITER(S): 9 INJECTION, SOLUTION INTRAVENOUS at 00:00

## 2023-09-24 RX ADMIN — Medication 2: at 00:00

## 2023-09-24 RX ADMIN — CHLORHEXIDINE GLUCONATE 15 MILLILITER(S): 213 SOLUTION TOPICAL at 06:02

## 2023-09-24 RX ADMIN — Medication 102 MILLIGRAM(S): at 04:26

## 2023-09-24 RX ADMIN — Medication 100 MILLIGRAM(S): at 23:05

## 2023-09-24 RX ADMIN — Medication 650 MILLIGRAM(S): at 07:07

## 2023-09-24 RX ADMIN — Medication 650 MILLIGRAM(S): at 13:45

## 2023-09-24 RX ADMIN — Medication 400 MILLIGRAM(S): at 23:45

## 2023-09-24 RX ADMIN — CHLORHEXIDINE GLUCONATE 15 MILLILITER(S): 213 SOLUTION TOPICAL at 23:59

## 2023-09-24 RX ADMIN — Medication 650 MILLIGRAM(S): at 06:03

## 2023-09-24 RX ADMIN — Medication 650 MILLIGRAM(S): at 19:45

## 2023-09-24 RX ADMIN — Medication 650 MILLIGRAM(S): at 01:00

## 2023-09-24 RX ADMIN — Medication 100 MILLIGRAM(S): at 15:15

## 2023-09-24 RX ADMIN — Medication 400 MILLIGRAM(S): at 23:15

## 2023-09-24 RX ADMIN — CHLORHEXIDINE GLUCONATE 15 MILLILITER(S): 213 SOLUTION TOPICAL at 15:20

## 2023-09-24 RX ADMIN — Medication 400 MILLIGRAM(S): at 13:31

## 2023-09-24 NOTE — PROGRESS NOTE ADULT - SUBJECTIVE AND OBJECTIVE BOX
S: MARLO overnight. Reporting some oral irritation this morning. Ability to tolerate PO liquids improving. Pain controlled.    O:   NAD  Tongue: incision intact, continued edema

## 2023-09-24 NOTE — PROGRESS NOTE ADULT - ASSESSMENT
A/P: 33yoF s/p tongue reduction.     - pain control prn  - mIVF, CLD as tolerated  - half-strength peridex rinses  - d/c dexamethasone  - wean O2 as tolerated  - activity as tolerated  - general care A/P: 33yoF s/p tongue reduction.     - pain control prn  - mIVF, FLD as tolerated  - half-strength peridex rinses  - d/c dexamethasone  - wean O2 as tolerated  - activity as tolerated  - general care

## 2023-09-25 VITALS
DIASTOLIC BLOOD PRESSURE: 73 MMHG | HEART RATE: 86 BPM | OXYGEN SATURATION: 98 % | RESPIRATION RATE: 17 BRPM | TEMPERATURE: 98 F | SYSTOLIC BLOOD PRESSURE: 107 MMHG

## 2023-09-25 LAB
GLUCOSE BLDC GLUCOMTR-MCNC: 100 MG/DL — HIGH (ref 70–99)
GLUCOSE BLDC GLUCOMTR-MCNC: 107 MG/DL — HIGH (ref 70–99)
GLUCOSE BLDC GLUCOMTR-MCNC: 89 MG/DL — SIGNIFICANT CHANGE UP (ref 70–99)

## 2023-09-25 PROCEDURE — 86850 RBC ANTIBODY SCREEN: CPT

## 2023-09-25 PROCEDURE — 86900 BLOOD TYPING SEROLOGIC ABO: CPT

## 2023-09-25 PROCEDURE — C9399: CPT

## 2023-09-25 PROCEDURE — 85027 COMPLETE CBC AUTOMATED: CPT

## 2023-09-25 PROCEDURE — 86901 BLOOD TYPING SEROLOGIC RH(D): CPT

## 2023-09-25 PROCEDURE — 83036 HEMOGLOBIN GLYCOSYLATED A1C: CPT

## 2023-09-25 PROCEDURE — 88307 TISSUE EXAM BY PATHOLOGIST: CPT

## 2023-09-25 PROCEDURE — 85610 PROTHROMBIN TIME: CPT

## 2023-09-25 PROCEDURE — 83735 ASSAY OF MAGNESIUM: CPT

## 2023-09-25 PROCEDURE — 82962 GLUCOSE BLOOD TEST: CPT

## 2023-09-25 PROCEDURE — 36415 COLL VENOUS BLD VENIPUNCTURE: CPT

## 2023-09-25 PROCEDURE — 85730 THROMBOPLASTIN TIME PARTIAL: CPT

## 2023-09-25 PROCEDURE — 80048 BASIC METABOLIC PNL TOTAL CA: CPT

## 2023-09-25 PROCEDURE — 84100 ASSAY OF PHOSPHORUS: CPT

## 2023-09-25 RX ORDER — GABAPENTIN 400 MG/1
6 CAPSULE ORAL
Qty: 90 | Refills: 0
Start: 2023-09-25 | End: 2023-09-29

## 2023-09-25 RX ORDER — ACETAMINOPHEN 500 MG
650 TABLET ORAL EVERY 6 HOURS
Refills: 0 | Status: DISCONTINUED | OUTPATIENT
Start: 2023-09-25 | End: 2023-09-25

## 2023-09-25 RX ORDER — IBUPROFEN 200 MG
30 TABLET ORAL
Qty: 600 | Refills: 0
Start: 2023-09-25 | End: 2023-09-29

## 2023-09-25 RX ORDER — IBUPROFEN 200 MG
600 TABLET ORAL EVERY 6 HOURS
Refills: 0 | Status: DISCONTINUED | OUTPATIENT
Start: 2023-09-25 | End: 2023-09-25

## 2023-09-25 RX ORDER — HYDROCODONE BITARTRATE AND ACETAMINOPHEN 7.5; 325 MG/15ML; MG/15ML
10 SOLUTION ORAL
Qty: 200 | Refills: 0
Start: 2023-09-25 | End: 2023-09-29

## 2023-09-25 RX ORDER — CHLORHEXIDINE GLUCONATE 213 G/1000ML
15 SOLUTION TOPICAL
Qty: 1 | Refills: 0
Start: 2023-09-25 | End: 2023-09-29

## 2023-09-25 RX ORDER — HYDROCODONE BITARTRATE AND ACETAMINOPHEN 7.5; 325 MG/15ML; MG/15ML
1 SOLUTION ORAL
Qty: 20 | Refills: 0
Start: 2023-09-25 | End: 2023-09-29

## 2023-09-25 RX ADMIN — Medication 650 MILLIGRAM(S): at 18:02

## 2023-09-25 RX ADMIN — CHLORHEXIDINE GLUCONATE 15 MILLILITER(S): 213 SOLUTION TOPICAL at 18:02

## 2023-09-25 RX ADMIN — SODIUM CHLORIDE 100 MILLILITER(S): 9 INJECTION, SOLUTION INTRAVENOUS at 02:03

## 2023-09-25 RX ADMIN — Medication 600 MILLIGRAM(S): at 12:02

## 2023-09-25 RX ADMIN — Medication 400 MILLIGRAM(S): at 07:09

## 2023-09-25 RX ADMIN — Medication 650 MILLIGRAM(S): at 01:14

## 2023-09-25 RX ADMIN — CHLORHEXIDINE GLUCONATE 15 MILLILITER(S): 213 SOLUTION TOPICAL at 07:09

## 2023-09-25 RX ADMIN — Medication 600 MILLIGRAM(S): at 18:02

## 2023-09-25 RX ADMIN — Medication 100 MILLIGRAM(S): at 15:49

## 2023-09-25 RX ADMIN — GABAPENTIN 300 MILLIGRAM(S): 400 CAPSULE ORAL at 01:05

## 2023-09-25 RX ADMIN — GABAPENTIN 300 MILLIGRAM(S): 400 CAPSULE ORAL at 07:09

## 2023-09-25 RX ADMIN — Medication 650 MILLIGRAM(S): at 02:14

## 2023-09-25 RX ADMIN — Medication 100 MILLIGRAM(S): at 07:09

## 2023-09-25 RX ADMIN — Medication 650 MILLIGRAM(S): at 11:15

## 2023-09-25 RX ADMIN — GABAPENTIN 300 MILLIGRAM(S): 400 CAPSULE ORAL at 15:09

## 2023-09-25 NOTE — DIETITIAN INITIAL EVALUATION ADULT - PERTINENT LABORATORY DATA
POCT Blood Glucose.: 107 mg/dL (09-25-23 @ 06:48)  A1C with Estimated Average Glucose Result: 5.4 % (09-22-23 @ 05:28)

## 2023-09-25 NOTE — PROGRESS NOTE ADULT - SUBJECTIVE AND OBJECTIVE BOX
Making excellent clinical progress  Tolerating mostly liquids  Requesting to be discharged since she hasn't slept well in the Hospital since surgery  Pain - mostly controlled with Gabapentin and tylenol   Intra- oral - resolving postop edema of tongue. Residual significant edema    remains. Suture line is intact.     Hygiene - ok  Good tongue mobility  Speech - intelligible    Plan - Discharge  Patient agrees to be around tomorrow  She understands the risk of flying to soon ( 6 hr plus flight to [place])  Will follow up in Office

## 2023-09-25 NOTE — DIETITIAN INITIAL EVALUATION ADULT - NSFNSNUTRCHEWSWALLOWFT_GEN_A_CORE
Per SLP note 9/22 "Unable to recommend an oral diet due to limited assessment/severe oral pain at this time" recommends "Patient will safely tolerate full liquids without overt s/s of penetration/aspiration or changes to pulmonary status."

## 2023-09-25 NOTE — DIETITIAN INITIAL EVALUATION ADULT - PERTINENT MEDS FT
MEDICATIONS  (STANDING):  acetaminophen   Oral Liquid .. 650 milliGRAM(s) Oral every 6 hours  chlorhexidine 0.12% Liquid 15 milliLiter(s) Swish and Spit two times a day  clindamycin IVPB 900 milliGRAM(s) IV Intermittent every 8 hours  dextrose 5% + sodium chloride 0.45%. 1000 milliLiter(s) (100 mL/Hr) IV Continuous <Continuous>  dextrose 5%. 1000 milliLiter(s) (50 mL/Hr) IV Continuous <Continuous>  dextrose 5%. 1000 milliLiter(s) (100 mL/Hr) IV Continuous <Continuous>  dextrose 50% Injectable 25 Gram(s) IV Push once  dextrose 50% Injectable 25 Gram(s) IV Push once  dextrose 50% Injectable 12.5 Gram(s) IV Push once  gabapentin Solution 300 milliGRAM(s) Oral three times a day  glucagon  Injectable 1 milliGRAM(s) IntraMuscular once  ibuprofen  Suspension. 600 milliGRAM(s) Oral every 6 hours  insulin lispro (ADMELOG) corrective regimen sliding scale   SubCutaneous every 6 hours  lidocaine 5% Ointment 1 Application(s) Topical once    MEDICATIONS  (PRN):  chlorhexidine 0.12% Liquid 15 milliLiter(s) Swish and Spit two times a day PRN oral hygiene  dextrose Oral Gel 15 Gram(s) Oral once PRN Blood Glucose LESS THAN 70 milliGRAM(s)/deciliter  HYDROmorphone  Injectable 0.5 milliGRAM(s) IV Push every 4 hours PRN Severe Pain (7 - 10)  HYDROmorphone  Injectable 0.2 milliGRAM(s) IV Push every 4 hours PRN Moderate Pain (4 - 6)

## 2023-09-25 NOTE — DISCHARGE NOTE NURSING/CASE MANAGEMENT/SOCIAL WORK - NSDCPEFALRISK_GEN_ALL_CORE
For information on Fall & Injury Prevention, visit: [email or web address] OR  [email or web address] OR  https://www.cdc.gov/steadi/patient.html

## 2023-09-25 NOTE — DIETITIAN INITIAL EVALUATION ADULT - ENERGY INTAKE
Pt is tolerating current diet: Full Liquid. Pt states that she is tired of the limited variety of the diet, however understands the diet progression. States that she has been consuming ~75% of the meals. Food Preferences noted.  Fair (50-75%)

## 2023-09-25 NOTE — DIETITIAN INITIAL EVALUATION ADULT - NSFNSGIASSESSMENTFT_GEN_A_CORE
No issues with nausea, vomiting, diarrhea, constipation reported at time of visit. Pt is not currently on any bowel regimen. Last BM per pt 9/23, however last documented BM on 9/20 in flow charts.

## 2023-09-25 NOTE — DIETITIAN INITIAL EVALUATION ADULT - ADD RECOMMEND
1. Continue current diet as tolerated: Full Liquids Fluids Diet texture/fluid consistencies based on SLP recommendations.  2. Recommend Ensure Enlive HP daily (350 kcal, 20 g protein in each) to optimize intake.   3. Provide ongoing education as needed.   4. Monitor PO intake, diet tolerance, weight trends, labs, and skin integrity and bowel movement regularity.   5. RD remains available upon request and will follow-up per protocol.

## 2023-09-25 NOTE — DISCHARGE NOTE NURSING/CASE MANAGEMENT/SOCIAL WORK - PATIENT PORTAL LINK FT
You can access the FollowMyHealth Patient Portal offered by [facility] by registering at the following website: [email or web address]. By joining [facility]’s FollowMyHealth portal, you will also be able to view your health information using other applications (apps) compatible with our system.

## 2023-09-25 NOTE — PROGRESS NOTE ADULT - SUBJECTIVE AND OBJECTIVE BOX
Plastic Surgery Progress Note    Subjective  - pt seen and examined on AM rounds  - pt states she is able to tolerate more PO   - Pt with pain at the tip of her tongue, is worried about infection      Objective  Physical Exam  GEN: NAD, comfortable  RESP: NO increased WOB  HEENT:  Tongue s/p excision, well healing, no signs of infection, no bleeding. minimal movement      Vital Signs  T(C): 36.8 (09-25-23 @ 04:36), Max: 37.2 (09-24-23 @ 14:00)  T(F): 98.2 (09-25-23 @ 04:36), Max: 98.9 (09-24-23 @ 14:00)  HR: 64 (09-25-23 @ 04:50) (56 - 88)  BP: 108/75 (09-25-23 @ 04:50) (108/75 - 118/81)  RR: 18 (09-25-23 @ 04:50) (17 - 18)  SpO2: 98% (09-25-23 @ 04:50) (98% - 100%)      24 Sep 2023 07:01  -  25 Sep 2023 07:00  --------------------------------------------------------  IN:    dextrose 5% + sodium chloride 0.45%: 2400 mL  Total IN: 2400 mL    OUT:    Voided (mL): 750 mL  Total OUT: 750 mL    Total NET: 1650 mL

## 2023-09-25 NOTE — DIETITIAN INITIAL EVALUATION ADULT - REASON
Observed with no overt signs and symptoms of muscle or fat wasting. Based on ASPEN guidelines, pt does not meet criteria for malnutrition.

## 2023-09-25 NOTE — DIETITIAN INITIAL EVALUATION ADULT - REASON FOR ADMISSION
"33yF PMHx depression, anxiety, macroplatelet disorder (bleeds easily?), who presents w/ enlarged tongue, now s/p tongue reduction (U-shaped glossectomy, closed primarily) (9/21)."

## 2023-09-25 NOTE — DIETITIAN INITIAL EVALUATION ADULT - OTHER INFO
- Currently on IV d5 + .45% NaCl  - s/p tongue excision 9/21   - Full Liquid Diet since 9/22  - A1C: 5.4% on 9/22; POCT ranges:  past 24 hours. Currently ordered for ISS q6 hrs   - Currently ordered for IV antibiotics

## 2023-09-25 NOTE — DISCHARGE NOTE PROVIDER - NSDCMRMEDTOKEN_GEN_ALL_CORE_FT
Lexapro 5 mg oral tablet: 1 tablet orally once a day  naltrexone 4.5 mg po daily:   norethindrone 0.35 mg oral tablet: 1 orally once a day  spironolactone 100 mg oral tablet: 1 orally once a day  Truvada 200 mg-300 mg oral tablet: 1 orally once a day   gabapentin 250 mg/5 mL oral solution: 6 milliliter(s) orally 3 times a day  hydrocodone-acetaminophen 5 mg-217 mg/10 mL oral liquid: 10 milliliter(s) orally every 6 hours as needed for  severe pain MDD: 40  ibuprofen 100 mg/5 mL oral suspension: 30 milliliter(s) orally every 6 hours  Lexapro 5 mg oral tablet: 1 tablet orally once a day  naltrexone 4.5 mg po daily:   norethindrone 0.35 mg oral tablet: 1 orally once a day  Peridex 0.12% mucous membrane liquid: 15 milliliter(s) orally 3 times a day  spironolactone 100 mg oral tablet: 1 orally once a day  Truvada 200 mg-300 mg oral tablet: 1 orally once a day

## 2023-09-25 NOTE — DISCHARGE NOTE PROVIDER - CARE PROVIDER_API CALL
MENG Davila  Plastic Surgery  [address] [address]  Phone: [phone]  Fax: [phone]  Follow Up Time:    MENG Davila  Plastic Surgery  [address] [address]  Phone: [phone]  Fax: [phone]  Follow Up Time:    MENG Davila  Plastic Surgery  [address] [address]  Phone: [phone]  Fax: [phone]  Follow Up Time:

## 2023-09-25 NOTE — PROGRESS NOTE ADULT - REASON FOR ADMISSION
Tongue Reduction

## 2023-09-25 NOTE — DIETITIAN INITIAL EVALUATION ADULT - OTHER CALCULATIONS
Based on Standards of Care pt within % IBW (90%) thus actual body weight used for all calculations. Needs adjusted for s/p tongue reduction.

## 2023-09-25 NOTE — PROGRESS NOTE ADULT - PROVIDER SPECIALTY LIST ADULT
Plastic Surgery
SICU

## 2023-09-25 NOTE — PROGRESS NOTE ADULT - ASSESSMENT
33yoF s/p tongue reduction on 9/21 with Dr Davila.  Doing well    - Pt able to tolerate more PO now  - Okay to be downgraded to standard medical floor      PRS

## 2023-09-25 NOTE — DIETITIAN INITIAL EVALUATION ADULT - ORAL INTAKE PTA/DIET HISTORY
Visited pt at bedside at bedside. States that she does not follow any specific diet at home. No cultural, ethnic, Jainism food preferences noted. Reports allergy to Gluten. States that her UBW is 120 pounds and has been stable in the past 6 months. Currently weighs 119 pounds (dosing) on 9/21.

## 2023-09-25 NOTE — DIETITIAN INITIAL EVALUATION ADULT - EDUCATION DIETARY MODIFICATIONS
Educated/Discussed the Full Liquid Diet. Reviewed different options on the menu compliant with the diet. Food Preferences noted. Pt receptive and agreeable to education./(2) meets goals/outcomes/verbalization

## 2023-09-25 NOTE — DIETITIAN INITIAL EVALUATION ADULT - NSFNSNUTRHOMESUPPLEMENTFT_GEN_A_CORE
States that at home pt takes Vitamin D, Iron, Vitamin C. Reports no additional use of oral nutritional supplement.

## 2023-09-29 DIAGNOSIS — Z88.2 ALLERGY STATUS TO SULFONAMIDES: ICD-10-CM

## 2023-09-29 DIAGNOSIS — Q38.2 MACROGLOSSIA: ICD-10-CM

## 2023-09-29 DIAGNOSIS — E28.2 POLYCYSTIC OVARIAN SYNDROME: ICD-10-CM

## 2023-09-29 DIAGNOSIS — Z91.018 ALLERGY TO OTHER FOODS: ICD-10-CM

## 2023-09-29 DIAGNOSIS — F32.A DEPRESSION, UNSPECIFIED: ICD-10-CM

## 2023-09-29 DIAGNOSIS — F41.9 ANXIETY DISORDER, UNSPECIFIED: ICD-10-CM

## 2023-10-02 LAB — SURGICAL PATHOLOGY STUDY: SIGNIFICANT CHANGE UP
